# Patient Record
Sex: FEMALE | Race: BLACK OR AFRICAN AMERICAN | URBAN - METROPOLITAN AREA
[De-identification: names, ages, dates, MRNs, and addresses within clinical notes are randomized per-mention and may not be internally consistent; named-entity substitution may affect disease eponyms.]

---

## 2020-01-29 ENCOUNTER — APPOINTMENT (RX ONLY)
Dept: URBAN - METROPOLITAN AREA CLINIC 174 | Facility: CLINIC | Age: 30
Setting detail: DERMATOLOGY
End: 2020-01-29

## 2020-01-29 DIAGNOSIS — L93.0 DISCOID LUPUS ERYTHEMATOSUS: ICD-10-CM | Status: INADEQUATELY CONTROLLED

## 2020-01-29 PROCEDURE — ? TREATMENT REGIMEN

## 2020-01-29 PROCEDURE — ? ORDER TESTS

## 2020-01-29 PROCEDURE — ? COUNSELING

## 2020-01-29 PROCEDURE — 99213 OFFICE O/P EST LOW 20 MIN: CPT

## 2020-01-29 PROCEDURE — ? PRESCRIPTION

## 2020-01-29 RX ORDER — HALOBETASOL PROPIONATE 0.5 MG/G
1 APPLICATION OINTMENT TOPICAL BID
Qty: 1 | Refills: 1 | Status: ERX | COMMUNITY
Start: 2020-01-29

## 2020-01-29 RX ADMIN — HALOBETASOL PROPIONATE 1 APPLICATION: 0.5 OINTMENT TOPICAL at 00:00

## 2020-01-29 ASSESSMENT — LOCATION ZONE DERM
LOCATION ZONE: LEG
LOCATION ZONE: SCALP

## 2020-01-29 ASSESSMENT — LOCATION SIMPLE DESCRIPTION DERM
LOCATION SIMPLE: LEFT CALF
LOCATION SIMPLE: POSTERIOR SCALP

## 2020-01-29 ASSESSMENT — SEVERITY ASSESSMENT: SEVERITY: MODERATE TO SEVERE

## 2020-01-29 ASSESSMENT — LOCATION DETAILED DESCRIPTION DERM
LOCATION DETAILED: LEFT PROXIMAL MEDIAL CALF
LOCATION DETAILED: LEFT OCCIPITAL SCALP

## 2020-01-29 NOTE — PROCEDURE: TREATMENT REGIMEN
Plan: She was given an order for labs to be drawn ASAP.  We also discussed that she needs to have an eye exam before I will re-initiate the Plaquenil.  She states she has an eye appt with [facility] on Friday in [place].  She states she will tell the doctor that she has been on Plaquenil and plans to restart it.  Once I receive the OK from the ophthalmologist and receive her lab results, will restart Plaquenil 200mg BID. Plan: She was given an order for labs to be drawn ASAP.  We also discussed that she needs to have an eye exam before I will re-initiate the Plaquenil.  She states she has an eye appt with [facility] on Friday in [place].  She states she will tell the doctor that she has been on Plaquenil and plans to restart it.  Once I receive the OK from the ophthalmologist and receive her lab results, will restart Plaquenil 200mg BID.

## 2020-04-07 ENCOUNTER — APPOINTMENT (RX ONLY)
Dept: URBAN - METROPOLITAN AREA CLINIC 377 | Facility: CLINIC | Age: 30
Setting detail: DERMATOLOGY
End: 2020-04-07

## 2020-04-07 DIAGNOSIS — Z02.9 ENCOUNTER FOR ADMINISTRATIVE EXAMINATIONS, UNSPECIFIED: ICD-10-CM

## 2020-04-07 PROCEDURE — ? ADDITIONAL NOTES

## 2020-04-07 NOTE — PROCEDURE: ADDITIONAL NOTES
Detail Level: Simple
Additional Notes: Patient no showed telehealth visit. Multiple attempts were made to reach patient via [employer] and [facility] [employer] but were unsuccessful.

## 2020-06-29 ENCOUNTER — APPOINTMENT (RX ONLY)
Dept: URBAN - METROPOLITAN AREA CLINIC 174 | Facility: CLINIC | Age: 30
Setting detail: DERMATOLOGY
End: 2020-06-29

## 2020-06-29 VITALS — TEMPERATURE: 97.8 F

## 2020-06-29 DIAGNOSIS — L93.0 DISCOID LUPUS ERYTHEMATOSUS: ICD-10-CM | Status: WORSENING

## 2020-06-29 PROCEDURE — ? COUNSELING

## 2020-06-29 PROCEDURE — ? PRESCRIPTION

## 2020-06-29 PROCEDURE — ? LAB REPORTS REVIEWED

## 2020-06-29 PROCEDURE — 99213 OFFICE O/P EST LOW 20 MIN: CPT

## 2020-06-29 PROCEDURE — ? TREATMENT REGIMEN

## 2020-06-29 RX ORDER — HYDROXYCHLOROQUINE SULFATE 200 MG/1
1 TAB TABLET ORAL BID
Qty: 60 | Refills: 2 | Status: ERX | COMMUNITY
Start: 2020-06-29

## 2020-06-29 RX ORDER — FLUOCINOLONE ACETONIDE 0.11 MG/ML
1 OIL TOPICAL QD
Qty: 1 | Refills: 1 | Status: ERX | COMMUNITY
Start: 2020-06-29

## 2020-06-29 RX ORDER — HALOBETASOL PROPIONATE 0.5 MG/G
1 APPLICATION OINTMENT TOPICAL BID
Qty: 1 | Refills: 1 | Status: ERX

## 2020-06-29 RX ADMIN — FLUOCINOLONE ACETONIDE 1: 0.11 OIL TOPICAL at 00:00

## 2020-06-29 RX ADMIN — HYDROXYCHLOROQUINE SULFATE 1 TAB: 200 TABLET ORAL at 00:00

## 2020-06-29 ASSESSMENT — LOCATION DETAILED DESCRIPTION DERM
LOCATION DETAILED: RIGHT PROXIMAL PRETIBIAL REGION
LOCATION DETAILED: LEFT OCCIPITAL SCALP
LOCATION DETAILED: LEFT PROXIMAL MEDIAL CALF

## 2020-06-29 ASSESSMENT — LOCATION SIMPLE DESCRIPTION DERM
LOCATION SIMPLE: POSTERIOR SCALP
LOCATION SIMPLE: RIGHT PRETIBIAL REGION
LOCATION SIMPLE: LEFT CALF

## 2020-06-29 ASSESSMENT — LOCATION ZONE DERM
LOCATION ZONE: LEG
LOCATION ZONE: SCALP

## 2020-06-29 ASSESSMENT — SEVERITY ASSESSMENT: SEVERITY: MODERATE

## 2020-06-29 NOTE — PROCEDURE: TREATMENT REGIMEN
Plan: Once I receive the labs from [facility], pending they are normal, I will restart her Plaquenil at 200mg BID.  She had her baseline eye exam and was cleared to start Plaquenil by her ophthalmologist (note in chart).  We discussed that lupus is chronic and relapsing with periods of remission and the goal is to keep it controlled.  We reviewed that sunlight can worsen lupus and she needs to wear a hat and/or sunscreen when she is outside.  I also told her that while she is on Plaquenil, she will need to see me every 3 months initially in order to receive refills on her medication.  I will see her back in 3 months or sooner PRN. Plan: Once I receive the labs from [facility], pending they are normal, I will restart her Plaquenil at 200mg BID.  She had her baseline eye exam and was cleared to start Plaquenil by her ophthalmologist (note in chart).  We discussed that lupus is chronic and relapsing with periods of remission and the goal is to keep it controlled.  We reviewed that sunlight can worsen lupus and she needs to wear a hat and/or sunscreen when she is outside.  I also told her that while she is on Plaquenil, she will need to see me every 3 months initially in order to receive refills on her medication.  I will see her back in 3 months or sooner PRN.

## 2021-01-25 ENCOUNTER — APPOINTMENT (RX ONLY)
Dept: URBAN - METROPOLITAN AREA CLINIC 174 | Facility: CLINIC | Age: 31
Setting detail: DERMATOLOGY
End: 2021-01-25

## 2021-01-25 DIAGNOSIS — L93.0 DISCOID LUPUS ERYTHEMATOSUS: ICD-10-CM | Status: INADEQUATELY CONTROLLED

## 2021-01-25 PROCEDURE — ? COUNSELING

## 2021-01-25 PROCEDURE — ? ORDER TESTS

## 2021-01-25 PROCEDURE — ? TREATMENT REGIMEN

## 2021-01-25 PROCEDURE — 99214 OFFICE O/P EST MOD 30 MIN: CPT

## 2021-01-25 PROCEDURE — ? PRESCRIPTION

## 2021-01-25 RX ORDER — FLUOCINOLONE ACETONIDE 0.11 MG/ML
1 APPLICATION OIL TOPICAL QD
Qty: 1 | Refills: 2 | Status: ERX | COMMUNITY
Start: 2021-01-25

## 2021-01-25 RX ORDER — KETOCONAZOLE 20 MG/ML
1 APPLICATION SHAMPOO, SUSPENSION TOPICAL
Qty: 1 | Refills: 2 | Status: ERX | COMMUNITY
Start: 2021-01-25

## 2021-01-25 RX ADMIN — FLUOCINOLONE ACETONIDE 1 APPLICATION: 0.11 OIL TOPICAL at 00:00

## 2021-01-25 RX ADMIN — KETOCONAZOLE 1 APPLICATION: 20 SHAMPOO, SUSPENSION TOPICAL at 00:00

## 2021-01-25 ASSESSMENT — LOCATION DETAILED DESCRIPTION DERM
LOCATION DETAILED: LEFT PROXIMAL MEDIAL CALF
LOCATION DETAILED: RIGHT PROXIMAL PRETIBIAL REGION
LOCATION DETAILED: RIGHT VENTRAL PROXIMAL FOREARM
LOCATION DETAILED: LEFT OCCIPITAL SCALP

## 2021-01-25 ASSESSMENT — LOCATION ZONE DERM
LOCATION ZONE: LEG
LOCATION ZONE: SCALP
LOCATION ZONE: ARM

## 2021-01-25 ASSESSMENT — LOCATION SIMPLE DESCRIPTION DERM
LOCATION SIMPLE: POSTERIOR SCALP
LOCATION SIMPLE: RIGHT FOREARM
LOCATION SIMPLE: LEFT CALF
LOCATION SIMPLE: RIGHT PRETIBIAL REGION

## 2021-01-25 ASSESSMENT — SEVERITY ASSESSMENT: SEVERITY: MODERATE

## 2021-01-25 NOTE — HPI: MEDICATION (PLAQUENIL)
How Severe Is It?: moderate
Is This A New Presentation, Or A Follow-Up?: Follow Up Plaquenil
How Many Months Of Plaquenil Have You Completed?: 3

## 2021-01-25 NOTE — PROCEDURE: TREATMENT REGIMEN
Otc Regimen: Continue Claritin 10mg PO QD-BID
Plan: The pt states she still has some Plaquenil left, but she should have been out 3 months ago if she was taking it as prescribed.  She is flaring today, mostly on her scalp and ears.  We discussed that lupus is chronic and relapsing with periods of remission and the goal is to keep it controlled.  We reviewed that sunlight can worsen lupus and she needs to wear a hat and/or sunscreen when she is outside.  She will need to see me every 3 months initially while on Plaquenil in order to receive refills on her medication.  She is due for labs, so a lab order was given today.  She is also due for en eye exam and is in the process of looking for a new ophthalmologist.  I will see her back in 3 months or sooner PRN.
Continue Regimen: Halobetasol to flares on legs/arms QD-BID PRN
Detail Level: Zone
Initiate Treatment: Derma-Smooth scalp oil QD for flares

## 2021-02-05 RX ORDER — HYDROXYCHLOROQUINE SULFATE 200 MG/1
1 TAB TABLET ORAL BID
Qty: 60 | Refills: 2 | Status: ERX

## 2021-04-27 ENCOUNTER — APPOINTMENT (RX ONLY)
Dept: URBAN - METROPOLITAN AREA CLINIC 174 | Facility: CLINIC | Age: 31
Setting detail: DERMATOLOGY
End: 2021-04-27

## 2021-04-27 DIAGNOSIS — L93.0 DISCOID LUPUS ERYTHEMATOSUS: ICD-10-CM | Status: INADEQUATELY CONTROLLED

## 2021-04-27 PROCEDURE — 99214 OFFICE O/P EST MOD 30 MIN: CPT

## 2021-04-27 PROCEDURE — ? COUNSELING

## 2021-04-27 PROCEDURE — ? ORDER TESTS

## 2021-04-27 PROCEDURE — ? PRESCRIPTION

## 2021-04-27 PROCEDURE — ? TREATMENT REGIMEN

## 2021-04-27 RX ORDER — LORATADINE 10 MG
1 TAB TABLET ORAL BID
Qty: 60 | Refills: 2 | Status: ERX | COMMUNITY
Start: 2021-04-27

## 2021-04-27 RX ORDER — HYDROXYCHLOROQUINE SULFATE 200 MG/1
ONE TABLET TABLET ORAL BID
Qty: 60 | Refills: 2 | Status: ERX

## 2021-04-27 RX ADMIN — Medication 1 TAB: at 00:00

## 2021-04-27 ASSESSMENT — LOCATION SIMPLE DESCRIPTION DERM: LOCATION SIMPLE: POSTERIOR SCALP

## 2021-04-27 ASSESSMENT — LOCATION DETAILED DESCRIPTION DERM: LOCATION DETAILED: LEFT OCCIPITAL SCALP

## 2021-04-27 ASSESSMENT — LOCATION ZONE DERM: LOCATION ZONE: SCALP

## 2021-04-27 ASSESSMENT — SEVERITY ASSESSMENT: SEVERITY: MILD TO MODERATE

## 2021-04-27 NOTE — HPI: MEDICATION (PLAQUENIL)
How Severe Is It?: mild
Is This A New Presentation, Or A Follow-Up?: Follow Up Plaquenil
Additional History: She was well controlled until recently when she went outside and got too much sun exposure on her scalp and now she is flaring there

## 2021-04-27 NOTE — PROCEDURE: TREATMENT REGIMEN
Otc Regimen: Continue Claritin 10mg PO QD-BID
Plan: Overall she is improved but recently flared after sun exposure on her posterior scalp, so I am going to continue the Plaquenil and topicals as outlined above.  Her last labs were stable (slightly anemic, BUN/Cr/LFT's WNL) and she had a recent eye exam that she reports was normal (will request records).  I will see her back in 3 months or sooner PRN.
Continue Regimen: Plaquenil 200 mg BID, Halobetasol PRN flares on legs/arms QD-BID PRN, Ketoconazole and Fluocinonide drops PRN
Detail Level: Zone